# Patient Record
Sex: FEMALE | URBAN - METROPOLITAN AREA
[De-identification: names, ages, dates, MRNs, and addresses within clinical notes are randomized per-mention and may not be internally consistent; named-entity substitution may affect disease eponyms.]

---

## 2020-10-20 ENCOUNTER — APPOINTMENT (RX ONLY)
Dept: URBAN - METROPOLITAN AREA CLINIC 106 | Facility: CLINIC | Age: 50
Setting detail: DERMATOLOGY
End: 2020-10-20

## 2020-10-20 VITALS — TEMPERATURE: 97.7 F

## 2020-10-20 DIAGNOSIS — D18.0 HEMANGIOMA: ICD-10-CM

## 2020-10-20 DIAGNOSIS — D22 MELANOCYTIC NEVI: ICD-10-CM

## 2020-10-20 DIAGNOSIS — L81.4 OTHER MELANIN HYPERPIGMENTATION: ICD-10-CM

## 2020-10-20 PROBLEM — D18.01 HEMANGIOMA OF SKIN AND SUBCUTANEOUS TISSUE: Status: ACTIVE | Noted: 2020-10-20

## 2020-10-20 PROBLEM — D22.5 MELANOCYTIC NEVI OF TRUNK: Status: ACTIVE | Noted: 2020-10-20

## 2020-10-20 PROCEDURE — ? ADDITIONAL NOTES

## 2020-10-20 PROCEDURE — 99203 OFFICE O/P NEW LOW 30 MIN: CPT

## 2020-10-20 PROCEDURE — ? COUNSELING

## 2020-10-20 ASSESSMENT — LOCATION ZONE DERM: LOCATION ZONE: TRUNK

## 2020-10-20 ASSESSMENT — LOCATION SIMPLE DESCRIPTION DERM: LOCATION SIMPLE: RIGHT UPPER BACK

## 2020-10-20 ASSESSMENT — LOCATION DETAILED DESCRIPTION DERM: LOCATION DETAILED: RIGHT SUPERIOR UPPER BACK

## 2020-10-20 NOTE — PROCEDURE: ADDITIONAL NOTES
Detail Level: Simple
Additional Notes: Patient is particularly concerned about few, light-colored lentigines of the forehead, cheeks, and upper chest\\nDiscussed treatment options including [facility] Lytera 2.0, chemical peels, BBL x 1-2 treatments (discussed upcoming open house event for 20% discount off BBL).\\nPt provided information and will consider.  If she wishes to treat with BBL, she should be schedule for 30 minutes (with me) on a Wednesday morning or Friday morning or afternoon and instructed to arrive 30 min early for topical anesthesia.

## 2020-12-11 ENCOUNTER — APPOINTMENT (RX ONLY)
Dept: URBAN - METROPOLITAN AREA CLINIC 115 | Facility: CLINIC | Age: 50
Setting detail: DERMATOLOGY
End: 2020-12-11

## 2020-12-11 DIAGNOSIS — Z41.9 ENCOUNTER FOR PROCEDURE FOR PURPOSES OTHER THAN REMEDYING HEALTH STATE, UNSPECIFIED: ICD-10-CM

## 2020-12-11 PROCEDURE — ? SCITON BBL

## 2020-12-11 ASSESSMENT — LOCATION ZONE DERM
LOCATION ZONE: TRUNK
LOCATION ZONE: FACE

## 2020-12-11 ASSESSMENT — LOCATION SIMPLE DESCRIPTION DERM
LOCATION SIMPLE: LEFT FOREHEAD
LOCATION SIMPLE: CHIN
LOCATION SIMPLE: LEFT CHEEK
LOCATION SIMPLE: CHEST
LOCATION SIMPLE: RIGHT CHEEK

## 2020-12-11 ASSESSMENT — LOCATION DETAILED DESCRIPTION DERM
LOCATION DETAILED: LEFT CENTRAL MALAR CHEEK
LOCATION DETAILED: STERNAL NOTCH
LOCATION DETAILED: RIGHT CENTRAL MALAR CHEEK
LOCATION DETAILED: LEFT CHIN
LOCATION DETAILED: LEFT MEDIAL FOREHEAD

## 2020-12-11 NOTE — HPI: COSMETIC (LASER BROWN SPOTS)
Have You Had Laser Removal Of Brown Spots Before?: has not had previous treatment
Additional History: Wants treatment of brown spots of face and upper chest (not neck)\\nTreatment area of chest delineated by patient prior to treatment\\nDeniadelina tan \\n

## 2020-12-11 NOTE — PROCEDURE: SCITON BBL
Consent: Written consent obtained, risks reviewed including but not limited to crusting, scabbing, blistering, scarring, darker or lighter pigmentary change, bruising, and/or incomplete response.
Spot Size: Finesse Adapter Size: 15 x 15 mm square
Cooling (In C): 15
Pulse Duration Units: milliseconds
Post-Care Instructions: I reviewed with the patient in detail post-care instructions. Sunscreen applied.  Patient should stay away from the sun and wear sun protection until treated areas are fully healed.
Repetition Rate (Hz): 10
Anesthesia Type: 1% lidocaine with epinephrine
Hide Repetition Rate?: No
Anesthesia Volume In Cc: 0
Fluence (J/Cm2): 25
Passes: 1
Pulse Duration: 20
Fluence (J/Cm2): 12
Topical Anesthesia?: 23% lidocaine, 7% tetracaine
Preprocedure Text: The treatment areas were thoroughly cleaned. Clear ultrasound gel was applied to the treatment area. Protective eyewear for patient and staff.  The area was treated with no immediate stacking of pulses.
Pulse Duration: 20
Fluence (J/Cm2): 8
Post Procedure Text: The patient tolerated the procedure well. No complications. Post care was reviewed with the patient.
Spot Size: Finesse Adapter Size: 15 x 45 mm (No Finesse Adapter)
Detail Level: Zone
Length Of Topical Anesthesia Application (Optional): 60 minutes

## 2021-02-26 ENCOUNTER — APPOINTMENT (RX ONLY)
Dept: URBAN - METROPOLITAN AREA CLINIC 115 | Facility: CLINIC | Age: 51
Setting detail: DERMATOLOGY
End: 2021-02-26

## 2021-02-26 DIAGNOSIS — Z41.9 ENCOUNTER FOR PROCEDURE FOR PURPOSES OTHER THAN REMEDYING HEALTH STATE, UNSPECIFIED: ICD-10-CM

## 2021-02-26 PROCEDURE — ? ADDITIONAL NOTES

## 2021-02-26 PROCEDURE — ? TREATMENT REGIMEN

## 2021-02-26 ASSESSMENT — LOCATION DETAILED DESCRIPTION DERM
LOCATION DETAILED: MIDDLE STERNUM
LOCATION DETAILED: UPPER STERNUM

## 2021-02-26 ASSESSMENT — LOCATION ZONE DERM: LOCATION ZONE: TRUNK

## 2021-02-26 ASSESSMENT — LOCATION SIMPLE DESCRIPTION DERM: LOCATION SIMPLE: CHEST

## 2021-02-26 NOTE — PROCEDURE: TREATMENT REGIMEN
Samples Given: Finacea foam to face qd to address some of the ill-defined hyperpigmentation of the lower face
Otc Regimen: Sunscreen to face qd
Detail Level: Zone

## 2021-02-26 NOTE — PROCEDURE: ADDITIONAL NOTES
Render Risk Assessment In Note?: no
Detail Level: Simple
Additional Notes: Written consent obtained, risks reviewed including but not limited to crusting, scabbing, blistering, scarring, darker or lighter pigmentary change, bruising, and/or incomplete response. \\n\\nPhotos taken\\nThe treatment area was cleaned and ultrasound gel was applied.  \\nProtective eyewear for patient and staff \\n\\nSciton BBL \\n\\n515 nm \\n\\nPigment - Full Face (forehead, temples, cheeks, nose, upper and lower cut lip, chin)\\n15 x 45 mm (with square adapter for the forehead, nose, and chin)\\n12 J/cm 2\\n15 ms \\n15 degrees celsius \\n200 pulses \\n3 passes \\n\\nPigment - Spot treatment \\n15 x 45 mm with [place] adapter\\n15 J/cm2\\n15 ms \\n15 degrees celsius \\n143 pulses \\n\\nEndpoint: lesional erythema +/- darkening or slight crusting \\n\\nPatient tolerated well.  No complications.  \\nThe ultrasound gel was removed and sunscreen was applied.  Patient was instructed to avoid sun exposure to the treated area for a minimum of 4 weeks.
Additional Notes: 12 lesions of the eternal chest were treated with light cryotherapy.  The patient tolerated well and wound care was reviewed.

## 2021-05-04 ENCOUNTER — RX ONLY (OUTPATIENT)
Age: 51
Setting detail: RX ONLY
End: 2021-05-04

## 2021-05-04 RX ORDER — PHARMACY COMPOUNDING ACCESSORY
EACH MISCELLANEOUS
Qty: 1 | Refills: 0 | Status: ERX | COMMUNITY
Start: 2021-05-04